# Patient Record
Sex: FEMALE | Race: WHITE | Employment: FULL TIME | URBAN - METROPOLITAN AREA
[De-identification: names, ages, dates, MRNs, and addresses within clinical notes are randomized per-mention and may not be internally consistent; named-entity substitution may affect disease eponyms.]

---

## 2023-02-10 ENCOUNTER — APPOINTMENT (OUTPATIENT)
Dept: ULTRASOUND IMAGING | Age: 23
End: 2023-02-10
Payer: COMMERCIAL

## 2023-02-10 ENCOUNTER — HOSPITAL ENCOUNTER (EMERGENCY)
Age: 23
Discharge: HOME | End: 2023-02-10
Attending: EMERGENCY MEDICINE
Payer: COMMERCIAL

## 2023-02-10 VITALS
HEART RATE: 80 BPM | DIASTOLIC BLOOD PRESSURE: 79 MMHG | TEMPERATURE: 98.8 F | RESPIRATION RATE: 17 BRPM | SYSTOLIC BLOOD PRESSURE: 113 MMHG | OXYGEN SATURATION: 99 %

## 2023-02-10 DIAGNOSIS — R11.2 NAUSEA VOMITING AND DIARRHEA: Primary | ICD-10-CM

## 2023-02-10 DIAGNOSIS — Z33.1 PREGNANCY AS INCIDENTAL FINDING: ICD-10-CM

## 2023-02-10 DIAGNOSIS — R19.7 NAUSEA VOMITING AND DIARRHEA: Primary | ICD-10-CM

## 2023-02-10 LAB
A/G RATIO: 1.6 (ref 1.1–2.2)
ALBUMIN SERPL-MCNC: 5 G/DL (ref 3.4–5)
ALP BLD-CCNC: 118 U/L (ref 40–129)
ALT SERPL-CCNC: 14 U/L (ref 10–40)
ANION GAP SERPL CALCULATED.3IONS-SCNC: 9 MMOL/L (ref 3–16)
AST SERPL-CCNC: 17 U/L (ref 15–37)
BACTERIA: ABNORMAL /HPF
BASOPHILS ABSOLUTE: 0.1 K/UL (ref 0–0.2)
BASOPHILS RELATIVE PERCENT: 1 %
BILIRUB SERPL-MCNC: 0.5 MG/DL (ref 0–1)
BILIRUBIN URINE: NEGATIVE
BLOOD, URINE: NEGATIVE
BUN BLDV-MCNC: 13 MG/DL (ref 7–20)
CALCIUM SERPL-MCNC: 10 MG/DL (ref 8.3–10.6)
CHLORIDE BLD-SCNC: 107 MMOL/L (ref 99–110)
CLARITY: CLEAR
CO2: 25 MMOL/L (ref 21–32)
COLOR: YELLOW
CREAT SERPL-MCNC: <0.5 MG/DL (ref 0.6–1.1)
EOSINOPHILS ABSOLUTE: 0.1 K/UL (ref 0–0.6)
EOSINOPHILS RELATIVE PERCENT: 2 %
EPITHELIAL CELLS, UA: 4 /HPF (ref 0–5)
GFR SERPL CREATININE-BSD FRML MDRD: >60 ML/MIN/{1.73_M2}
GLUCOSE BLD-MCNC: 78 MG/DL (ref 70–99)
GLUCOSE URINE: NEGATIVE MG/DL
GONADOTROPIN, CHORIONIC (HCG) QUANT: 755.3 MIU/ML
HCG QUALITATIVE: POSITIVE
HCT VFR BLD CALC: 41 % (ref 36–48)
HEMOGLOBIN: 13.7 G/DL (ref 12–16)
HYALINE CASTS: 0 /LPF (ref 0–8)
KETONES, URINE: NEGATIVE MG/DL
LEUKOCYTE ESTERASE, URINE: ABNORMAL
LIPASE: 37 U/L (ref 13–60)
LYMPHOCYTES ABSOLUTE: 2.1 K/UL (ref 1–5.1)
LYMPHOCYTES RELATIVE PERCENT: 29.2 %
MCH RBC QN AUTO: 29.5 PG (ref 26–34)
MCHC RBC AUTO-ENTMCNC: 33.4 G/DL (ref 31–36)
MCV RBC AUTO: 88.3 FL (ref 80–100)
MICROSCOPIC EXAMINATION: YES
MONOCYTES ABSOLUTE: 0.5 K/UL (ref 0–1.3)
MONOCYTES RELATIVE PERCENT: 7.5 %
NEUTROPHILS ABSOLUTE: 4.3 K/UL (ref 1.7–7.7)
NEUTROPHILS RELATIVE PERCENT: 60.3 %
NITRITE, URINE: NEGATIVE
PDW BLD-RTO: 12.3 % (ref 12.4–15.4)
PH UA: 6.5 (ref 5–8)
PLATELET # BLD: 276 K/UL (ref 135–450)
PMV BLD AUTO: 8.7 FL (ref 5–10.5)
POTASSIUM REFLEX MAGNESIUM: 3.8 MMOL/L (ref 3.5–5.1)
PROTEIN UA: NEGATIVE MG/DL
RBC # BLD: 4.65 M/UL (ref 4–5.2)
RBC UA: 0 /HPF (ref 0–4)
SODIUM BLD-SCNC: 141 MMOL/L (ref 136–145)
SPECIFIC GRAVITY UA: 1.02 (ref 1–1.03)
TOTAL PROTEIN: 8.2 G/DL (ref 6.4–8.2)
URINE REFLEX TO CULTURE: ABNORMAL
URINE TYPE: ABNORMAL
UROBILINOGEN, URINE: 0.2 E.U./DL
WBC # BLD: 7.1 K/UL (ref 4–11)
WBC UA: 4 /HPF (ref 0–5)

## 2023-02-10 PROCEDURE — 36415 COLL VENOUS BLD VENIPUNCTURE: CPT

## 2023-02-10 PROCEDURE — 6370000000 HC RX 637 (ALT 250 FOR IP): Performed by: EMERGENCY MEDICINE

## 2023-02-10 PROCEDURE — 84702 CHORIONIC GONADOTROPIN TEST: CPT

## 2023-02-10 PROCEDURE — 85025 COMPLETE CBC W/AUTO DIFF WBC: CPT

## 2023-02-10 PROCEDURE — 99284 EMERGENCY DEPT VISIT MOD MDM: CPT | Performed by: EMERGENCY MEDICINE

## 2023-02-10 PROCEDURE — 83690 ASSAY OF LIPASE: CPT

## 2023-02-10 PROCEDURE — 81001 URINALYSIS AUTO W/SCOPE: CPT

## 2023-02-10 PROCEDURE — 76801 OB US < 14 WKS SINGLE FETUS: CPT

## 2023-02-10 PROCEDURE — 84703 CHORIONIC GONADOTROPIN ASSAY: CPT

## 2023-02-10 PROCEDURE — 80053 COMPREHEN METABOLIC PANEL: CPT

## 2023-02-10 RX ORDER — ONDANSETRON 4 MG/1
4 TABLET, ORALLY DISINTEGRATING ORAL ONCE
Status: COMPLETED | OUTPATIENT
Start: 2023-02-10 | End: 2023-02-10

## 2023-02-10 RX ORDER — DICYCLOMINE HYDROCHLORIDE 10 MG/1
10 CAPSULE ORAL ONCE
Status: COMPLETED | OUTPATIENT
Start: 2023-02-10 | End: 2023-02-10

## 2023-02-10 RX ORDER — ONDANSETRON 4 MG/1
4 TABLET, ORALLY DISINTEGRATING ORAL 3 TIMES DAILY PRN
Qty: 21 TABLET | Refills: 0 | Status: SHIPPED | OUTPATIENT
Start: 2023-02-10

## 2023-02-10 RX ORDER — PNV NO.95/FERROUS FUM/FOLIC AC 28MG-0.8MG
1 TABLET ORAL DAILY
Qty: 30 TABLET | Refills: 5 | Status: SHIPPED | OUTPATIENT
Start: 2023-02-10

## 2023-02-10 RX ADMIN — ONDANSETRON 4 MG: 4 TABLET, ORALLY DISINTEGRATING ORAL at 06:37

## 2023-02-10 RX ADMIN — DICYCLOMINE HYDROCHLORIDE 10 MG: 10 CAPSULE ORAL at 06:37

## 2023-02-10 ASSESSMENT — LIFESTYLE VARIABLES
HOW MANY STANDARD DRINKS CONTAINING ALCOHOL DO YOU HAVE ON A TYPICAL DAY: PATIENT DOES NOT DRINK
HOW OFTEN DO YOU HAVE A DRINK CONTAINING ALCOHOL: NEVER

## 2023-02-10 NOTE — ED NOTES
Discharge paperwork given to and reviewed with pt. Pt verbalized understanding and all questions answered. Pt encouraged to return if having worsening symptoms or new symptoms discussed in discharge paperwork. Pt to follow up with OBGYN  Rx x 2 given and medications reviewed with pt. IV removed with catheter intact. Pt in NAD, RR even and unlabored.  Pt off unit ambulatory     [facility]  02/10/23 102

## 2023-02-10 NOTE — ED PROVIDER NOTES
[facility]) Emergency [address] [facility]    Margie Zamora MD, am the primary clinician of record. CHIEF COMPLAINT  Chief Complaint   Patient presents with    Rectal Bleeding     Pt arrives in the ED via walk in   Pt stated she has been having n/v/d  Pt had dark black stool this morning   Pt also complains of abdominal pain on the right side that feels like cramping      HISTORY OF PRESENT ILLNESS  Bryant Sainz is a 23 y.o. female  who presents to the ED complaining of onset Monday of some nausea and looser stools. The stools have become more loose and watery, and she took Pepto-Bismol 2 days ago and subsequently to that has had progressively dark stools to the point where they were almost black in color this morning. She denies any history of peptic ulcer disease. No prior abdominal surgeries. She does not believe herself to be pregnant but is not sure. She denies any vaginal bleeding or discharge. She does report some improved dysuria from a recent UTI and did complete antibiotics for it but is not sure what 1. She tells me initially that her pain migrates throughout the abdomen, but currently it is more on the right side but earlier in the week it was more left-sided. She denies any unusual or uncooked foods recently, she has had steak but was well cooked and other people who ate it are not sick and no other sick contacts. She has no history of C. difficile. She denies any fevers. No chest pain cough shortness of breath or generalized body aches. Denies any bright red stools. No other complaints, modifying factors or associated symptoms. I have reviewed the following from the nursing documentation. History reviewed. No pertinent past medical history. History reviewed. No pertinent surgical history. History reviewed. No pertinent family history.   Social History     Socioeconomic History    Marital status: Single     Spouse name: Not on file    Number of children: Not on file    Years of education: Not on file    Highest education level: Not on file   Occupational History    Not on file   Tobacco Use    Smoking status: Never    Smokeless tobacco: Never   Substance and Sexual Activity    Alcohol use: Not on file    Drug use: Not on file    Sexual activity: Not on file   Other Topics Concern    Not on file   Social History Narrative    Not on file     Social Determinants of Health     Financial Resource Strain: Not on file   Food Insecurity: Not on file   Transportation Needs: Not on file   Physical Activity: Not on file   Stress: Not on file   Social Connections: Not on file   Intimate Partner Violence: Not on file   Housing Stability: Not on file     No current facility-administered medications for this encounter. Current Outpatient Medications   Medication Sig Dispense Refill    Prenatal Vit-Fe Fumarate-FA (PRENATAL VITAMIN) 27-0.8 MG TABS Take 1 tablet by mouth daily 30 tablet 5    ondansetron (ZOFRAN-ODT) 4 MG disintegrating tablet Take 1 tablet by mouth 3 times daily as needed for Nausea or Vomiting 21 tablet 0     Allergies   Allergen Reactions    Penicillins Anaphylaxis       REVIEW OF SYSTEMS  10 systems reviewed, pertinent positives per HPI otherwise noted to be negative. PHYSICAL EXAM  /79   Pulse 80   Temp 98.8 °F (37.1 °C) (Oral)   Resp 17   SpO2 99%    GENERAL APPEARANCE: Awake and alert. Cooperative. No distress. HENT: Normocephalic. Atraumatic. Mucous membranes are dry. NECK: Supple. EYES: PERRL. EOM's grossly intact. HEART/CHEST: RRR. No murmurs. No chest wall tenderness. LUNGS: Respirations unlabored. CTAB. Good air exchange. Speaking comfortably in full sentences. ABDOMEN: Mild diffuse and nonfocal tenderness. Soft. Non-distended. No masses. No organomegaly. No guarding or rebound. Hyperactive bowel sounds throughout. MUSCULOSKELETAL: No extremity edema. Compartments soft. No deformity. No tenderness in the extremities.   All extremities neurovascularly intact. SKIN: Warm and dry. No acute rashes. NEUROLOGICAL: Alert and oriented. CN's 2-12 intact. No gross facial drooping. Strength 5/5, sensation intact. 2 plus DTR's in knees bilaterally. Gait normal.  PSYCHIATRIC: Normal mood and affect. LABS  I have personally reviewed all labs for this visit.    Results for orders placed or performed during the hospital encounter of 02/10/23   CBC with Auto Differential   Result Value Ref Range    WBC 7.1 4.0 - 11.0 K/uL    RBC 4.65 4.00 - 5.20 M/uL    Hemoglobin 13.7 12.0 - 16.0 g/dL    Hematocrit 41.0 36.0 - 48.0 %    MCV 88.3 80.0 - 100.0 fL    MCH 29.5 26.0 - 34.0 pg    MCHC 33.4 31.0 - 36.0 g/dL    RDW 12.3 (L) 12.4 - 15.4 %    Platelets 250 112 - 560 K/uL    MPV 8.7 5.0 - 10.5 fL    Neutrophils % 60.3 %    Lymphocytes % 29.2 %    Monocytes % 7.5 %    Eosinophils % 2.0 %    Basophils % 1.0 %    Neutrophils Absolute 4.3 1.7 - 7.7 K/uL    Lymphocytes Absolute 2.1 1.0 - 5.1 K/uL    Monocytes Absolute 0.5 0.0 - 1.3 K/uL    Eosinophils Absolute 0.1 0.0 - 0.6 K/uL    Basophils Absolute 0.1 0.0 - 0.2 K/uL   Comprehensive Metabolic Panel w/ Reflex to MG   Result Value Ref Range    Sodium 141 136 - 145 mmol/L    Potassium reflex Magnesium 3.8 3.5 - 5.1 mmol/L    Chloride 107 99 - 110 mmol/L    CO2 25 21 - 32 mmol/L    Anion Gap 9 3 - 16    Glucose 78 70 - 99 mg/dL    BUN 13 7 - 20 mg/dL    Creatinine <0.5 (L) 0.6 - 1.1 mg/dL    Est, Glom Filt Rate >60 >60    Calcium 10.0 8.3 - 10.6 mg/dL    Total Protein 8.2 6.4 - 8.2 g/dL    Albumin 5.0 3.4 - 5.0 g/dL    Albumin/Globulin Ratio 1.6 1.1 - 2.2    Total Bilirubin 0.5 0.0 - 1.0 mg/dL    Alkaline Phosphatase 118 40 - 129 U/L    ALT 14 10 - 40 U/L    AST 17 15 - 37 U/L   Lipase   Result Value Ref Range    Lipase 37.0 13.0 - 60.0 U/L   Urinalysis with Reflex to Culture    Specimen: Urine   Result Value Ref Range    Color, UA Yellow Straw/Yellow    Clarity, UA Clear Clear    Glucose, Ur Negative Negative mg/dL    Bilirubin Urine Negative Negative    Ketones, Urine Negative Negative mg/dL    Specific Gravity, UA 1.020 1.005 - 1.030    Blood, Urine Negative Negative    pH, UA 6.5 5.0 - 8.0    Protein, UA Negative Negative mg/dL    Urobilinogen, Urine 0.2 <2.0 E.U./dL    Nitrite, Urine Negative Negative    Leukocyte Esterase, Urine SMALL (A) Negative    Microscopic Examination YES     Urine Type urine     Urine Reflex to Culture Not Indicated    hCG, serum, qualitative   Result Value Ref Range    hCG Qual POSITIVE Detects HCG level >10 MIU/mL   hCG, quantitative, pregnancy   Result Value Ref Range    hCG Quant 755.3 <5.0 mIU/mL   Microscopic Urinalysis   Result Value Ref Range    Bacteria, UA Rare (A) None Seen /HPF    Hyaline Casts, UA 0 0 - 8 /LPF    WBC, UA 4 0 - 5 /HPF    RBC, UA 0 0 - 4 /HPF    Epithelial Cells, UA 4 0 - 5 /HPF       EKG performed in ED:  None    RADIOLOGY  Any applicable radiology studies including x-ray, CT, MRI, and/or ultrasound, were reviewed independently by me in addition to the radiologist.  I reviewed all radiology images and reports as well from this evaluation. US OB LESS THAN 14 WEEKS SINGLE OR FIRST GESTATION    Result Date: 2/10/2023  1. No evidence of an intrauterine gestation; correlate with serial beta HCG. ED COURSE/MDM  Patient seen and evaluated. Old records reviewed. Labs and imaging reviewed. After initial evaluation, differential diagnostic considerations included but not limited to: kidney stone, pyelonephritis, UTI, appendicitis, bowel obstruction, diverticulitis, hernia, gastritis/gastroenteritis, pancreatitis, cholecystitis, hepatitis, constipation, IBS, IBD    The patient's ED workup was notable for symptoms of nausea vomiting and diarrhea. Her abdominal exam is actually fairly benign. It is not focalizing on exam and she does not have peritonitis, vaginal bleeding or discharge.   She is incidentally found to be pregnant with LMP about a month ago and a quant of 755 all consistent with early pregnancy for several weeks. With her quant below the discriminatory zone value of 1500, her ultrasound demonstrating no IUP may just be early pregnancy in the absence of pelvic pain vaginal bleeding or other signs of an ectopic pregnancy or other complication. Nonetheless, this is her first pregnancy and ectopic precautions were discussed given the uncertainty of the definitive location of this pregnancy and OB follow-up was coordinated by given her the prenatal clinic. I will start her on prenatal vitamins and advised only Tylenol for pain which she says she does not need a prescription for. I will give her Zofran for nausea as well. She was unable to provide a stool sample here further lowering concern for any sort of bacterial diarrheal infection and again her abdominal exam is very benign. She does not have a fever or leukocytosis or peritonitis. She does not have a metabolic derangement or lipase elevation either. Surgical pathology is felt to be highly unlikely. Urinalysis without significant UTI findings. She was discharged with close PCP follow-up. She was given oral Bentyl and ODT Zofran here with improvement and has not vomited or had diarrhea here. Because this is her first pregnancy she was quite tearful and unaware that this could be a contributing factor to her symptoms but we did talk about the importance of prenatal care and follow-up. Is this patient to be included in the SEP-1 Core Measure? No   Exclusion criteria - the patient is NOT to be included for SEP-1 Core Measure due to:   Infection is not suspected      Consults:  None    History obtained from: Patient    Pertinent social determinants of health: None applicable    Chronic conditions potentially affecting care: None applicable    Review of other records:  None    Reassessment:  See MDM for details of medications given and reassessment    During the patient's ED course, the patient was given: Medications   dicyclomine (BENTYL) capsule 10 mg (10 mg Oral Given 2/10/23 5950)   ondansetron (ZOFRAN-ODT) disintegrating tablet 4 mg (4 mg Oral Given 2/10/23 5045)        CLINICAL IMPRESSION  1. Nausea vomiting and diarrhea    2. Pregnancy as incidental finding        Blood pressure 113/79, pulse 80, temperature 98.8 °F (37.1 °C), temperature source Oral, resp. rate 17, SpO2 99 %. DISPOSITION  Mary Pacheco was discharged in stable condition. I have discussed the findings of today's workup with the patient and addressed the patient's questions and concerns. Important warning signs as well as new or worsening symptoms which would necessitate immediate return to the ED were discussed. The plan is to discharge from the ED at this time, and the patient is in stable condition. The patient acknowledged understanding is agreeable with this plan. Patient was given scripts for the following medications. I counseled patient how to take these medications. Discharge Medication List as of 2/10/2023 10:24 AM        START taking these medications    Details   Prenatal Vit-Fe Fumarate-FA (PRENATAL VITAMIN) 27-0.8 MG TABS Take 1 tablet by mouth daily, Disp-30 tablet, R-5Normal      ondansetron (ZOFRAN-ODT) 4 MG disintegrating tablet Take 1 tablet by mouth 3 times daily as needed for Nausea or Vomiting, Disp-21 tablet, R-0Normal             Follow-up with:  [facility]  [address] [facility] [address] [place] [address]  Schedule an appointment as soon as possible for a visit in 3 days  For symptom re-evaluation    [facility] Emergency Department  [address]  Go to   If symptoms worsen    Critical care time:  None    DISCLAIMER: This chart was created using Dragon dictation software.   Efforts were made by me to ensure accuracy, however some errors may be present due to limitations of this technology and occasionally words are not transcribed correctly.         Jennifer Pierce MD  02/10/23 1121